# Patient Record
Sex: MALE | Race: WHITE
[De-identification: names, ages, dates, MRNs, and addresses within clinical notes are randomized per-mention and may not be internally consistent; named-entity substitution may affect disease eponyms.]

---

## 2019-03-06 ENCOUNTER — HOSPITAL ENCOUNTER (OUTPATIENT)
Dept: HOSPITAL 89 - US | Age: 29
End: 2019-03-06
Attending: FAMILY MEDICINE
Payer: COMMERCIAL

## 2019-03-06 DIAGNOSIS — E04.1: Primary | ICD-10-CM

## 2019-03-06 PROCEDURE — 76536 US EXAM OF HEAD AND NECK: CPT

## 2019-03-06 NOTE — RADIOLOGY IMAGING REPORT
FACILITY: [facility] - [place] 

 

PATIENT NAME: Balwinder Wheeler

: 1990

MR: [id number]

V: [id number]

EXAM DATE: 

ORDERING PHYSICIAN: DORIAN MANCERA

TECHNOLOGIST: 

 

Location: [facility] - [place]

Patient: Balwinder Wheeler

: 1990

MRN: [id number]

Visit/Account:[id number]

Date of Sevice:  3/06/2019

 

ACCESSION #: [id number]

 

THYROID

 

HISTORY:  Left thyroid nodule

 

COMPARISON:  None.

 

FINDINGS:

 

SIZE:

Right lobe: 4.8 x 1.6 x 1.7 cm

Left lobe: 5.1 x 1.6 x 1.6 cm

Isthmus: 5 mm

 

PARENCHYMA: Homogeneous.

 

NODULES:

Right lobe:

*  None discrete.

Left lobe:

*  There is a well-circumscribed hypoechoic nodule in the mid to inferior left lobe measuring 1.3 x 0
.9 x 2 cm.  There suggestion of tiny calcifications within this nodule

Isthmus:

*  None discrete.

 

VASCULARITY: Within normal limits.

 

ADDITIONAL FINDINGS: None.

 

IMPRESSION:

There is a 2 cm hypoechoic nodule within the inferior left lobe which appears to contain punctate cheikh
cifications.  Ultrasound-guided fine-needle aspiration is recommended

 

 

REFERENCE:

2015 American Thyroid Association Management Guidelines for Adult Patients with Thyroid Nodules and D
ifferentiated Thyroid Cancer: The American Thyroid Association Guidelines Task Force on Thyroid Nodul
es and Differentiated Thyroid Cancer.

 

SONOGRAPHIC PATTERNS:

*  Benign: Purely cystic nodules (no solid component); estimated risk of malignancy <1 percent; no bi
opsy recommended.

*  Very Low Suspicion: Spongiform or partially cystic nodules without any of the sonographic features
 described in low, intermediate, or high suspicion patterns; estimated risk of malignancy <3 percent;
 consider FNA at > 2 cm (Observation without FNA is also a reasonable option).

*  Low Suspicion: Isoechoic or hyperechoic solid nodule, or partially cystic nodule with eccentric so
lid areas, without microcalcification, irregular margin or ETE (extra-thyroidal extension), or taller
 than wide shape; estimated risk of malignancy 5-10 percent; recommend FNA at >1.5 cm.

*  Intermediate Suspicion: Hypoechoic solid nodule with smooth margins without microcalcifications, E
TE (extra-thyroidal extension), or taller than wide shape; estimated risk of malignancy 10-20 percent
; recommend FNA at > 1 cm.

*  High Suspicion: Solid hypoechoic nodule or solid hypoechoic component of a partially cystic nodule
 with one or more of the following features: irregular margins (infiltrative, microlobulated), microc
alcifications, taller than wide shape, rim calcifications with small extrusive soft tissue component,
 evidence of ETE (extra-thyroidal extension); estimated risk of malignancy >70-90 percent; recommend 
FNA at > 1 cm.

 

NOTES:

*  Although a sonographically suspicious subcentimeter thyroid nodule without evidence of extrathyroi
елена extension or sonographically suspicious lymph nodes may be observed with close sonographic follow
-up rather than pursuing immediate FNA, patient age and preference may modify decision-making.

A > 50% interval increase in nodule volume and/or development of new suspicious sonographic features 
are felt to be a valid reasons for potential re-aspiration of a nodule previously shown to have benig
n FNA cytology.

 

Report Dictated By: Madie Sierra MD at 3/6/2019 5:04 PM

 

Report E-Signed By: Madie Sierra MD  at 3/6/2019 5:05 PM

 

WSN:[email or web address]

## 2019-03-18 ENCOUNTER — HOSPITAL ENCOUNTER (OUTPATIENT)
Dept: HOSPITAL 89 - US | Age: 29
LOS: 1 days | Discharge: HOME | End: 2019-03-19
Attending: FAMILY MEDICINE
Payer: COMMERCIAL

## 2019-03-18 DIAGNOSIS — E04.9: Primary | ICD-10-CM

## 2019-03-18 LAB — INR PPP: 0.98

## 2019-03-18 PROCEDURE — 88104 CYTOPATH FL NONGYN SMEARS: CPT

## 2019-03-18 PROCEDURE — 88172 CYTP DX EVAL FNA 1ST EA SITE: CPT

## 2019-03-18 PROCEDURE — 84443 ASSAY THYROID STIM HORMONE: CPT

## 2019-03-18 PROCEDURE — 10005 FNA BX W/US GDN 1ST LES: CPT

## 2019-03-18 PROCEDURE — 85610 PROTHROMBIN TIME: CPT

## 2019-03-18 PROCEDURE — 36415 COLL VENOUS BLD VENIPUNCTURE: CPT

## 2019-03-18 PROCEDURE — 76942 ECHO GUIDE FOR BIOPSY: CPT

## 2019-03-19 NOTE — RADIOLOGY IMAGING REPORT
FACILITY: [facility] 

 

PATIENT NAME: Balwinder Wheeler

: 1990

MR: [id number]

V: [id number]

EXAM DATE: [id number]

ORDERING PHYSICIAN: DORIAN MANCERA

TECHNOLOGIST: 

 

Location: [facility] - [place]

Patient: Balwinder Wheeler

: 1990

MRN: [id number]

Visit/Account:[id number]

Date of Sevice:  3/19/2019

 

ACCESSION #: [id number]

 

Exam type: US BIOPSY LOC/INJ THYROID

 

History: Left thyroid nodule

 

Comparison: Thyroid ultrasound 2019.

 

Findings:

 

Informed consent was obtained including potential risks and complications such as bleeding and infect
ion.  The patient's left side of his neck was prepped and draped usual sterile fashion.  Local anesth
esia was accomplished with 1% lidocaine.  Under sonographic guidance four 25-gauge FNA biopsies were 
obtained through the heterogeneous mass in the left-sided thyroid gland.  Samples were given to the p
athology technologist for processing.  The procedure was accomplished without apparent complication.

 

IMPRESSION:

 

1.  Successful sonographically guided FNA biopsy of the left thyroid mass

 

Report Dictated By: Madie Sierra MD at 3/19/2019 5:29 PM

 

Report E-Signed By: Madie Sierra MD  at 3/19/2019 5:31 PM

 

WSN:[email or web address]